# Patient Record
Sex: FEMALE | Race: WHITE
[De-identification: names, ages, dates, MRNs, and addresses within clinical notes are randomized per-mention and may not be internally consistent; named-entity substitution may affect disease eponyms.]

---

## 2017-08-23 ENCOUNTER — HOSPITAL ENCOUNTER (EMERGENCY)
Dept: HOSPITAL 24 - ER | Age: 22
Discharge: HOME | End: 2017-08-23
Payer: COMMERCIAL

## 2017-08-23 VITALS — SYSTOLIC BLOOD PRESSURE: 135 MMHG | DIASTOLIC BLOOD PRESSURE: 82 MMHG

## 2017-08-23 VITALS — BODY MASS INDEX: 34.9 KG/M2
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
## 2017-08-23 DIAGNOSIS — S16.1XXA: ICD-10-CM

## 2017-08-23 DIAGNOSIS — S70.12XA: Primary | ICD-10-CM

## 2017-08-23 PROCEDURE — 72125 CT NECK SPINE W/O DYE: CPT

## 2017-08-23 PROCEDURE — 99282 EMERGENCY DEPT VISIT SF MDM: CPT

## 2017-08-23 PROCEDURE — 73552 X-RAY EXAM OF FEMUR 2/>: CPT
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
## 2018-01-04 ENCOUNTER — HOSPITAL ENCOUNTER (EMERGENCY)
Dept: HOSPITAL 24 - ER | Age: 23
Discharge: HOME | End: 2018-01-04
Payer: COMMERCIAL

## 2018-01-04 VITALS — BODY MASS INDEX: 38.2 KG/M2

## 2018-01-04 VITALS — DIASTOLIC BLOOD PRESSURE: 79 MMHG | SYSTOLIC BLOOD PRESSURE: 132 MMHG
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
## 2018-01-04 DIAGNOSIS — S50.02XA: Primary | ICD-10-CM
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
## 2018-01-04 DIAGNOSIS — S30.0XXA: ICD-10-CM

## 2018-01-04 PROCEDURE — 73070 X-RAY EXAM OF ELBOW: CPT

## 2018-01-04 PROCEDURE — 99283 EMERGENCY DEPT VISIT LOW MDM: CPT

## 2018-01-04 PROCEDURE — 72220 X-RAY EXAM SACRUM TAILBONE: CPT

## 2018-01-04 PROCEDURE — 96372 THER/PROPH/DIAG INJ SC/IM: CPT

## 2018-01-04 PROCEDURE — 99282 EMERGENCY DEPT VISIT SF MDM: CPT

## 2018-01-04 NOTE — RAD
Sacrum, coccyx, four views 



Indication: Fall with coccygeal pain



Comparison: None



Findings: Evaluation of the sacrum is limited on the AP views due to overlying bowel gas. The sacrum 
and sacrococcygeal junction appear grossly intact on the lateral views. No displaced fracture or jerson
lignment is identified. SI joints and pubic symphysis are intact. There is no gross soft tissue injur
y.



Impression:  

No acute radiographic abnormality of the sacrum or coccyx.

















Reported By:Electronically Signed by LISANDRO BERTRAND MD at 1/4/2018 6:43:50 PM
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
